# Patient Record
Sex: FEMALE | URBAN - METROPOLITAN AREA
[De-identification: names, ages, dates, MRNs, and addresses within clinical notes are randomized per-mention and may not be internally consistent; named-entity substitution may affect disease eponyms.]

---

## 2023-06-06 ENCOUNTER — APPOINTMENT (RX ONLY)
Dept: URBAN - METROPOLITAN AREA CLINIC 11 | Facility: CLINIC | Age: 33
Setting detail: DERMATOLOGY
End: 2023-06-06

## 2023-06-06 DIAGNOSIS — Z41.1 ENCOUNTER FOR COSMETIC SURGERY: ICD-10-CM

## 2023-06-06 PROCEDURE — 99003: CPT

## 2023-06-06 PROCEDURE — ? CONSULTATION - RHINOPLASTY

## 2023-06-06 NOTE — PROCEDURE: CONSULTATION - RHINOPLASTY
Detail Level: Simple
Patient Goals: Patient wants the hump removed and has dissatisfaction with tip of nose
Consultation Charge $ (Use Numbers Only, No Text Please.): 100.00
Send Procedure Quote As Charge: No